# Patient Record
Sex: FEMALE | Race: WHITE
[De-identification: names, ages, dates, MRNs, and addresses within clinical notes are randomized per-mention and may not be internally consistent; named-entity substitution may affect disease eponyms.]

---

## 2020-07-24 ENCOUNTER — HOSPITAL ENCOUNTER (OUTPATIENT)
Dept: HOSPITAL 74 - JASU-SURG | Age: 30
Discharge: HOME | End: 2020-07-24
Attending: SURGERY
Payer: COMMERCIAL

## 2020-07-24 VITALS — BODY MASS INDEX: 19.8 KG/M2

## 2020-07-24 VITALS — SYSTOLIC BLOOD PRESSURE: 115 MMHG | HEART RATE: 109 BPM | DIASTOLIC BLOOD PRESSURE: 72 MMHG

## 2020-07-24 VITALS — TEMPERATURE: 98.3 F

## 2020-07-24 DIAGNOSIS — K64.4: Primary | ICD-10-CM

## 2020-07-24 PROCEDURE — 0DBQXZZ EXCISION OF ANUS, EXTERNAL APPROACH: ICD-10-PCS | Performed by: SURGERY

## 2020-07-24 NOTE — OP
Operative Note





- Note:


Operative Date: 07/24/20


Pre-Operative Diagnosis: hemorrhoids


Operation: EUA, hemorrhoidectomy and excision of anal papillae


Findings: 





anal papillae and external hemorrhoids


Post-Operative Diagnosis: Same as Pre-op


Surgeon: Anil Sanders


Anesthesiologist/CRNA: Geovany Condon


Anesthesia: General


Specimens Removed: external hemorrhoid and anal papillae


Estimated Blood Loss (mls): 1


Operative Report Dictated: Yes

## 2020-07-24 NOTE — OP
DATE OF OPERATION:  07/24/2020

 

PROCEDURE:  Examination under anesthesia, hemorrhoidectomy and excision of anal

papillae.  

 

PREOPERATIVE DIAGNOSIS:  External hemorrhoids.  

 

POSTOPERATIVE DIAGNOSIS:  External hemorrhoids and anal papillae.

 

SURGEON:  Anil Sanders MD. 

 

ANESTHESIA:  General by laryngeal mask airway.

 

FINDINGS ON PROCEDURE:  This is a 30-year-old female who presents with 
prolapsing

1-cm anal tissue at the 6 o'clock position and on Valsalva maneuver has smaller

protrusion of external hemorrhoids at the right anterior hemorrhoidal column. 

The patient was advised examination under anesthesia and removal of the anal 
lesion. 

Consent was obtained after discussion of the risks, benefits, and alternatives 
to the

procedure.

 

DESCRIPTION OF PROCEDURE:  Patient was brought to the operating room and placed 
in

supine position.  General anesthesia via laryngeal mask airway was administered.
 The

patient was then placed in extended lithotomy position.  The perineum was 
prepped and

draped in the usual sterile fashion.  Using lidocaine 1% with epinephrine local

anesthesia, perianal anesthesia was administered.  The digital rectal exam was 
done

which revealed no abnormal rectal masses or lesions.  Using the Hill-Messi 
anal

retractor, the anal canal and distal rectum was carefully inspected.  The 6 
o'clock 1

cm external hemorrhoid/papilla was identified and a small external hemorrhoids 
of the

right posterior column was also identified.  Two small anal papillae were 
identified

at the left lateral 3 o'clock position and 8 o'clock position.  The external

hemorrhoids/hypertrophic anal papilla was excised using Bovie cautery.  The 
external

hemorrhoids of the right anterior column was excised by applying a 
figure-of-eight

suture at the base of the hemorrhoid above the dentate line using Vicryl 3-0.  
This

was then resected using the Bovie cautery.  The smaller anal papillae which was 
about

2 mm in size at the 8 o'clock and 3 o'clock positions were also excised using 
the

Bovie cautery.  The anal canal was carefully inspected and noted to be free of

bleeding.  A small piece of Surgicel was inserted to the anal canal for further

hemostasis.  The patient was then placed in a supine position and successfully

extubated.  The patient was transferred to the post anesthesia care unit in

satisfactory condition.  Estimated blood loss was about 1 mL.  Wound class

contaminated.  

 

 

BOONE [id number]

DD: 07/24/2020 08:56

DT: 07/24/2020 19:30

Job #:  [id number]

ELIZ

## 2020-07-28 NOTE — PATH
Surgical Pathology Report



Patient Name:  MARIO COBOS

Accession #:  [id number]

Med. Rec. #:  [id number]                                                        

   /Age/Gender:  1990 (Age: 30) / F

Account:  [id number]                                                          

             Location: [facility]

Taken:  2020

Received:  2020

Reported:  2020

Physicians:  Anil Sanders M.D.

  



Specimen(s) Received

A: HEMORRHOIDS 

B: ANAL PAPULAR 





Clinical History

Hemorrhoids







Final Diagnosis

A. "HEMORRHOIDS", HEMORRHOIDECTOMY:

CONSISTENT WITH BENIGN FIBROEPITHELIAL POLYP.



B. ANAL PAPULAR, EXCISION:

BENIGN POLYPOID SQUAMOUS MUCOSA SUGGESTIVE OF FIBROEPITHELIAL POLYP.



Comment: Suggest clinical correlation.







***Electronically Signed***

Roxie Barnes M.D.





Gross Description

A. Received in formalin labeled "hemorrhoid," are 2 tan-pink, polypoid portions

of soft tissue measuring 0.7 x 0.6 x 0.3 cm and 0.8 x 0.7 x 0.3 cm. The

specimens are submitted in toto in one cassette.



B. Received in formalin labeled "anal papular," are 3 tan-pink portions of

possible skin ranging from 0.3-0.5 cm in greatest dimension. The specimens are

submitted in toto in one cassette.

## 2020-11-13 ENCOUNTER — HOSPITAL ENCOUNTER (OUTPATIENT)
Dept: HOSPITAL 74 - JASU-SURG | Age: 30
Discharge: HOME | End: 2020-11-13
Attending: SURGERY
Payer: COMMERCIAL

## 2020-11-13 VITALS — BODY MASS INDEX: 19.5 KG/M2

## 2020-11-13 VITALS — TEMPERATURE: 97.1 F | SYSTOLIC BLOOD PRESSURE: 113 MMHG | DIASTOLIC BLOOD PRESSURE: 66 MMHG | HEART RATE: 98 BPM

## 2020-11-13 DIAGNOSIS — K64.5: Primary | ICD-10-CM

## 2020-11-13 DIAGNOSIS — K64.4: ICD-10-CM

## 2020-11-13 PROCEDURE — 06BY0ZC EXCISION OF HEMORRHOIDAL PLEXUS, OPEN APPROACH: ICD-10-PCS | Performed by: SURGERY
